# Patient Record
Sex: MALE | Race: WHITE
[De-identification: names, ages, dates, MRNs, and addresses within clinical notes are randomized per-mention and may not be internally consistent; named-entity substitution may affect disease eponyms.]

---

## 2017-09-20 ENCOUNTER — HOSPITAL ENCOUNTER (OUTPATIENT)
Dept: HOSPITAL 24 - RAD | Age: 57
Discharge: HOME | DRG: 881 | End: 2017-09-20
Attending: FAMILY MEDICINE
Payer: COMMERCIAL

## 2017-09-20 DIAGNOSIS — M25.561: ICD-10-CM

## 2017-09-20 DIAGNOSIS — F34.1: Primary | ICD-10-CM

## 2017-09-20 DIAGNOSIS — M22.42: ICD-10-CM

## 2017-09-20 DIAGNOSIS — M22.41: ICD-10-CM

## 2017-09-20 DIAGNOSIS — M17.0: ICD-10-CM

## 2017-09-20 DIAGNOSIS — M25.562: ICD-10-CM

## 2017-09-20 PROCEDURE — 73560 X-RAY EXAM OF KNEE 1 OR 2: CPT

## 2017-09-20 NOTE — RAD
HISTORY:  Left knee pain.



Complete two view series of the left knee joint. 



Findings:



Examination of the knee joint demonstrate no evidence for acute fracture or dislocation.  The medial 
and lateral tibiofemoral compartments demonstrate moderate to severe joint space narrowing and osteop
hyte formation; most severe in the medial knee compartment. The findings are compatible with moderate
 to severe degenerative joint disease.  The lateral radiograph demonstrates trace joint fluid.  Acosta
lofemoral compartment also shows moderate to severe DJD with chondromalacia patella. There is mild so
ft tissue swelling about the knee joint without underlying bony injury or fracture seen. No aggressiv
e / destructive lytic bony lesions are seen.





IMPRESSION:

 

Moderate to severe left knee joint tricompartmental osteoarthritis, most severe medially, with trace 
suprapatellar knee joint fluid and advanced changes of chondromalacia patella. No evidence for an acu
te fracture, subluxation, or lytic bony lesion.





Reported By:Electronically Signed by NE HOUSER III, MD at 9/20/2017 1:43:25 PM

## 2017-09-20 NOTE — RAD
HISTORY:  Right knee pain.



Complete two view series of the right knee joint. 



Findings:



Examination of the knee joint demonstrate no evidence for acute fracture or dislocation.  The medial 
and lateral tibiofemoral compartments demonstrate moderate to severe joint space narrowing and osteop
hyte formation; most severe in the medial knee compartment. There is also a degenerative appearing ge
nu valgum deformity. The findings are compatible with moderate to severe degenerative joint disease. 
 The lateral radiograph demonstrates a very small suprapatellar joint effusion.  Patellofemoral gutierrez
rtment also shows moderate to severe DJD with chondromalacia patella. There is mild soft tissue swell
ing about the knee joint without underlying bony injury or fracture seen. No aggressive / destructive
 lytic bony lesions are seen.





IMPRESSION:

 

Moderate to severe right knee joint tricompartmental osteoarthritis, most severe medially, with a deg
enerative appearing genu valgum deformity and chondromalacia patella. No evidence for an acute fractu
re, subluxation, or lytic bony lesion.





Reported By:Electronically Signed by NE HOUSER III, MD at 9/20/2017 1:44:10 PM

## 2018-04-16 ENCOUNTER — HOSPITAL ENCOUNTER (EMERGENCY)
Dept: HOSPITAL 24 - ER | Age: 58
Discharge: HOME | End: 2018-04-16
Payer: COMMERCIAL

## 2018-04-16 VITALS — BODY MASS INDEX: 28.8 KG/M2

## 2018-04-16 VITALS — SYSTOLIC BLOOD PRESSURE: 131 MMHG | DIASTOLIC BLOOD PRESSURE: 84 MMHG

## 2018-04-16 DIAGNOSIS — M54.89: ICD-10-CM

## 2018-04-16 DIAGNOSIS — M47.812: Primary | ICD-10-CM
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
## 2018-04-16 DIAGNOSIS — R51: ICD-10-CM

## 2018-04-16 PROCEDURE — 70450 CT HEAD/BRAIN W/O DYE: CPT

## 2018-04-16 PROCEDURE — 72125 CT NECK SPINE W/O DYE: CPT

## 2018-04-16 PROCEDURE — 72072 X-RAY EXAM THORAC SPINE 3VWS: CPT

## 2018-04-16 PROCEDURE — 73560 X-RAY EXAM OF KNEE 1 OR 2: CPT

## 2018-04-16 PROCEDURE — 99282 EMERGENCY DEPT VISIT SF MDM: CPT
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
## 2020-02-23 ENCOUNTER — HOSPITAL ENCOUNTER (EMERGENCY)
Dept: HOSPITAL 67 - ED | Age: 60
Discharge: STILL ADMITTED OR ADMINISTRATIVE | End: 2020-02-23
Payer: COMMERCIAL

## 2020-02-23 VITALS — SYSTOLIC BLOOD PRESSURE: 134 MMHG | DIASTOLIC BLOOD PRESSURE: 94 MMHG | TEMPERATURE: 98.3 F

## 2020-02-23 VITALS — HEIGHT: 68 IN | BODY MASS INDEX: 27.28 KG/M2 | WEIGHT: 180 LBS

## 2020-02-23 DIAGNOSIS — L03.116: Primary | ICD-10-CM

## 2020-02-23 DIAGNOSIS — B95.62: ICD-10-CM

## 2020-02-23 LAB
APTT BLD: 26.1 SECONDS (ref 24.5–33.6)
PLATELET # BLD: 384 K/UL (ref 142–355)
POTASSIUM SERPL-SCNC: 3.5 MMOL/L (ref 3.6–5.2)
SODIUM SERPL-SCNC: 136 MMOL/L (ref 136–145)

## 2020-02-23 PROCEDURE — 99284 EMERGENCY DEPT VISIT MOD MDM: CPT

## 2020-02-23 PROCEDURE — 85379 FIBRIN DEGRADATION QUANT: CPT

## 2020-02-23 PROCEDURE — 96365 THER/PROPH/DIAG IV INF INIT: CPT

## 2020-02-23 PROCEDURE — 87040 BLOOD CULTURE FOR BACTERIA: CPT

## 2020-02-23 PROCEDURE — 36415 COLL VENOUS BLD VENIPUNCTURE: CPT

## 2020-02-23 PROCEDURE — 85730 THROMBOPLASTIN TIME PARTIAL: CPT

## 2020-02-23 PROCEDURE — 83605 ASSAY OF LACTIC ACID: CPT

## 2020-02-23 PROCEDURE — 85610 PROTHROMBIN TIME: CPT

## 2020-02-23 PROCEDURE — 80053 COMPREHEN METABOLIC PANEL: CPT

## 2020-02-23 PROCEDURE — 85027 COMPLETE CBC AUTOMATED: CPT

## 2020-02-23 PROCEDURE — 96375 TX/PRO/DX INJ NEW DRUG ADDON: CPT

## 2020-02-24 ENCOUNTER — HOSPITAL ENCOUNTER (INPATIENT)
Dept: HOSPITAL 67 - ED | Age: 60
LOS: 6 days | Discharge: HOME | DRG: 603 | End: 2020-03-01
Attending: INTERNAL MEDICINE | Admitting: EMERGENCY MEDICINE
Payer: COMMERCIAL

## 2020-02-24 VITALS
HEIGHT: 68 IN | WEIGHT: 209.25 LBS | BODY MASS INDEX: 31.71 KG/M2 | WEIGHT: 209.25 LBS | BODY MASS INDEX: 31.71 KG/M2 | HEIGHT: 68 IN

## 2020-02-24 VITALS — DIASTOLIC BLOOD PRESSURE: 69 MMHG | SYSTOLIC BLOOD PRESSURE: 126 MMHG | TEMPERATURE: 98.2 F

## 2020-02-24 VITALS — SYSTOLIC BLOOD PRESSURE: 115 MMHG | TEMPERATURE: 98.4 F | DIASTOLIC BLOOD PRESSURE: 77 MMHG

## 2020-02-24 DIAGNOSIS — J44.9: ICD-10-CM

## 2020-02-24 DIAGNOSIS — I95.89: ICD-10-CM

## 2020-02-24 DIAGNOSIS — G89.4: ICD-10-CM

## 2020-02-24 DIAGNOSIS — I25.10: ICD-10-CM

## 2020-02-24 DIAGNOSIS — L03.116: ICD-10-CM

## 2020-02-24 DIAGNOSIS — Z72.0: ICD-10-CM

## 2020-02-24 DIAGNOSIS — L02.416: Primary | ICD-10-CM

## 2020-02-24 LAB
PLATELET # BLD: 346 K/UL (ref 142–355)
POTASSIUM SERPL-SCNC: 3.9 MMOL/L (ref 3.6–5.2)
SODIUM SERPL-SCNC: 135 MMOL/L (ref 136–145)

## 2020-02-24 PROCEDURE — 85651 RBC SED RATE NONAUTOMATED: CPT

## 2020-02-24 PROCEDURE — 36415 COLL VENOUS BLD VENIPUNCTURE: CPT

## 2020-02-24 PROCEDURE — A9576 INJ PROHANCE MULTIPACK: HCPCS

## 2020-02-24 PROCEDURE — 87186 SC STD MICRODIL/AGAR DIL: CPT

## 2020-02-24 PROCEDURE — 99284 EMERGENCY DEPT VISIT MOD MDM: CPT

## 2020-02-24 PROCEDURE — 84550 ASSAY OF BLOOD/URIC ACID: CPT

## 2020-02-24 PROCEDURE — 85027 COMPLETE CBC AUTOMATED: CPT

## 2020-02-24 PROCEDURE — 87077 CULTURE AEROBIC IDENTIFY: CPT

## 2020-02-24 PROCEDURE — 87070 CULTURE OTHR SPECIMN AEROBIC: CPT

## 2020-02-24 PROCEDURE — 87205 SMEAR GRAM STAIN: CPT

## 2020-02-24 PROCEDURE — 86140 C-REACTIVE PROTEIN: CPT

## 2020-02-24 PROCEDURE — 96365 THER/PROPH/DIAG IV INF INIT: CPT

## 2020-02-24 PROCEDURE — 80048 BASIC METABOLIC PNL TOTAL CA: CPT

## 2020-02-24 PROCEDURE — 80202 ASSAY OF VANCOMYCIN: CPT

## 2020-02-24 PROCEDURE — 87185 SC STD ENZYME DETCJ PER NZM: CPT

## 2020-02-24 PROCEDURE — 83605 ASSAY OF LACTIC ACID: CPT

## 2020-02-24 PROCEDURE — 83036 HEMOGLOBIN GLYCOSYLATED A1C: CPT

## 2020-02-25 VITALS — DIASTOLIC BLOOD PRESSURE: 63 MMHG | SYSTOLIC BLOOD PRESSURE: 102 MMHG | TEMPERATURE: 97.4 F

## 2020-02-25 VITALS — SYSTOLIC BLOOD PRESSURE: 105 MMHG | TEMPERATURE: 98.1 F | DIASTOLIC BLOOD PRESSURE: 76 MMHG

## 2020-02-25 VITALS — SYSTOLIC BLOOD PRESSURE: 113 MMHG | TEMPERATURE: 98.5 F | DIASTOLIC BLOOD PRESSURE: 70 MMHG

## 2020-02-25 VITALS — SYSTOLIC BLOOD PRESSURE: 109 MMHG | TEMPERATURE: 98.4 F | DIASTOLIC BLOOD PRESSURE: 71 MMHG

## 2020-02-25 VITALS — SYSTOLIC BLOOD PRESSURE: 93 MMHG | TEMPERATURE: 98.4 F | DIASTOLIC BLOOD PRESSURE: 61 MMHG

## 2020-02-25 VITALS — TEMPERATURE: 98.4 F | SYSTOLIC BLOOD PRESSURE: 111 MMHG | DIASTOLIC BLOOD PRESSURE: 71 MMHG

## 2020-02-26 VITALS — TEMPERATURE: 98.9 F | SYSTOLIC BLOOD PRESSURE: 95 MMHG | DIASTOLIC BLOOD PRESSURE: 52 MMHG

## 2020-02-26 VITALS — TEMPERATURE: 98.2 F | SYSTOLIC BLOOD PRESSURE: 111 MMHG | DIASTOLIC BLOOD PRESSURE: 63 MMHG

## 2020-02-26 VITALS — TEMPERATURE: 98.4 F | SYSTOLIC BLOOD PRESSURE: 104 MMHG | DIASTOLIC BLOOD PRESSURE: 72 MMHG

## 2020-02-26 VITALS — DIASTOLIC BLOOD PRESSURE: 49 MMHG | TEMPERATURE: 98.2 F | SYSTOLIC BLOOD PRESSURE: 77 MMHG

## 2020-02-26 VITALS — SYSTOLIC BLOOD PRESSURE: 117 MMHG | TEMPERATURE: 98.5 F | DIASTOLIC BLOOD PRESSURE: 88 MMHG

## 2020-02-26 VITALS — DIASTOLIC BLOOD PRESSURE: 69 MMHG | TEMPERATURE: 97.6 F | SYSTOLIC BLOOD PRESSURE: 112 MMHG

## 2020-02-26 LAB
PLATELET # BLD: 314 K/UL (ref 142–355)
POTASSIUM SERPL-SCNC: 3.4 MMOL/L (ref 3.6–5.2)
SODIUM SERPL-SCNC: 138 MMOL/L (ref 136–145)

## 2020-02-27 VITALS — SYSTOLIC BLOOD PRESSURE: 112 MMHG | DIASTOLIC BLOOD PRESSURE: 74 MMHG | TEMPERATURE: 98.6 F

## 2020-02-27 VITALS — DIASTOLIC BLOOD PRESSURE: 72 MMHG | SYSTOLIC BLOOD PRESSURE: 108 MMHG | TEMPERATURE: 98.6 F

## 2020-02-27 VITALS — TEMPERATURE: 98.8 F | SYSTOLIC BLOOD PRESSURE: 112 MMHG | DIASTOLIC BLOOD PRESSURE: 87 MMHG

## 2020-02-27 VITALS — TEMPERATURE: 98.1 F | DIASTOLIC BLOOD PRESSURE: 78 MMHG | SYSTOLIC BLOOD PRESSURE: 133 MMHG

## 2020-02-27 VITALS — SYSTOLIC BLOOD PRESSURE: 113 MMHG | DIASTOLIC BLOOD PRESSURE: 65 MMHG | TEMPERATURE: 98.5 F

## 2020-02-27 LAB
PLATELET # BLD: 391 K/UL (ref 142–355)
POTASSIUM SERPL-SCNC: 4.7 MMOL/L (ref 3.6–5.2)
SODIUM SERPL-SCNC: 140 MMOL/L (ref 136–145)

## 2020-02-28 VITALS — DIASTOLIC BLOOD PRESSURE: 69 MMHG | TEMPERATURE: 98.5 F | SYSTOLIC BLOOD PRESSURE: 114 MMHG

## 2020-02-28 VITALS — SYSTOLIC BLOOD PRESSURE: 121 MMHG | DIASTOLIC BLOOD PRESSURE: 83 MMHG | TEMPERATURE: 99 F

## 2020-02-28 VITALS — TEMPERATURE: 98.6 F | DIASTOLIC BLOOD PRESSURE: 74 MMHG | SYSTOLIC BLOOD PRESSURE: 116 MMHG

## 2020-02-28 VITALS — DIASTOLIC BLOOD PRESSURE: 80 MMHG | SYSTOLIC BLOOD PRESSURE: 116 MMHG | TEMPERATURE: 98.8 F

## 2020-02-28 VITALS — SYSTOLIC BLOOD PRESSURE: 121 MMHG | TEMPERATURE: 98.5 F | DIASTOLIC BLOOD PRESSURE: 78 MMHG

## 2020-02-28 VITALS — DIASTOLIC BLOOD PRESSURE: 74 MMHG | TEMPERATURE: 99.3 F | SYSTOLIC BLOOD PRESSURE: 120 MMHG

## 2020-02-28 VITALS — TEMPERATURE: 98.1 F | SYSTOLIC BLOOD PRESSURE: 106 MMHG | DIASTOLIC BLOOD PRESSURE: 62 MMHG

## 2020-02-28 LAB
PLATELET # BLD: 373 K/UL (ref 142–355)
POTASSIUM SERPL-SCNC: 3.5 MMOL/L (ref 3.6–5.2)
SODIUM SERPL-SCNC: 137 MMOL/L (ref 136–145)

## 2020-02-28 PROCEDURE — 0H9LXZZ DRAINAGE OF LEFT LOWER LEG SKIN, EXTERNAL APPROACH: ICD-10-PCS | Performed by: SURGERY

## 2020-02-29 VITALS — SYSTOLIC BLOOD PRESSURE: 118 MMHG | DIASTOLIC BLOOD PRESSURE: 71 MMHG | TEMPERATURE: 98.2 F

## 2020-02-29 VITALS — TEMPERATURE: 97.8 F | DIASTOLIC BLOOD PRESSURE: 72 MMHG | SYSTOLIC BLOOD PRESSURE: 125 MMHG

## 2020-02-29 VITALS — TEMPERATURE: 98.3 F | DIASTOLIC BLOOD PRESSURE: 85 MMHG | SYSTOLIC BLOOD PRESSURE: 137 MMHG

## 2020-02-29 VITALS — SYSTOLIC BLOOD PRESSURE: 117 MMHG | DIASTOLIC BLOOD PRESSURE: 76 MMHG | TEMPERATURE: 97.9 F

## 2020-02-29 VITALS — SYSTOLIC BLOOD PRESSURE: 116 MMHG | DIASTOLIC BLOOD PRESSURE: 78 MMHG | TEMPERATURE: 97.8 F

## 2020-02-29 VITALS — TEMPERATURE: 98.3 F | SYSTOLIC BLOOD PRESSURE: 134 MMHG | DIASTOLIC BLOOD PRESSURE: 82 MMHG

## 2020-02-29 LAB — PLATELET # BLD: 399 K/UL (ref 142–355)

## 2020-03-01 VITALS — DIASTOLIC BLOOD PRESSURE: 57 MMHG | TEMPERATURE: 98 F | SYSTOLIC BLOOD PRESSURE: 105 MMHG

## 2020-03-01 VITALS — TEMPERATURE: 97.8 F | DIASTOLIC BLOOD PRESSURE: 88 MMHG | SYSTOLIC BLOOD PRESSURE: 135 MMHG

## 2020-03-01 VITALS — DIASTOLIC BLOOD PRESSURE: 79 MMHG | TEMPERATURE: 98 F | SYSTOLIC BLOOD PRESSURE: 123 MMHG

## 2020-03-01 LAB
PLATELET # BLD: 453 K/UL (ref 142–355)
POTASSIUM SERPL-SCNC: 3.9 MMOL/L (ref 3.6–5.2)
SODIUM SERPL-SCNC: 137 MMOL/L (ref 136–145)